# Patient Record
Sex: FEMALE | NOT HISPANIC OR LATINO
[De-identification: names, ages, dates, MRNs, and addresses within clinical notes are randomized per-mention and may not be internally consistent; named-entity substitution may affect disease eponyms.]

---

## 2017-01-24 PROBLEM — Z00.00 ENCOUNTER FOR PREVENTIVE HEALTH EXAMINATION: Status: ACTIVE | Noted: 2017-01-24

## 2017-03-09 ENCOUNTER — APPOINTMENT (OUTPATIENT)
Dept: ENDOCRINOLOGY | Facility: CLINIC | Age: 62
End: 2017-03-09

## 2020-02-12 ENCOUNTER — APPOINTMENT (OUTPATIENT)
Dept: PULMONOLOGY | Facility: CLINIC | Age: 65
End: 2020-02-12
Payer: COMMERCIAL

## 2020-02-12 VITALS
SYSTOLIC BLOOD PRESSURE: 120 MMHG | RESPIRATION RATE: 12 BRPM | HEART RATE: 79 BPM | TEMPERATURE: 97.9 F | HEIGHT: 68 IN | OXYGEN SATURATION: 98 % | BODY MASS INDEX: 23.19 KG/M2 | DIASTOLIC BLOOD PRESSURE: 80 MMHG | WEIGHT: 153 LBS

## 2020-02-12 DIAGNOSIS — J31.0 CHRONIC RHINITIS: ICD-10-CM

## 2020-02-12 DIAGNOSIS — Z82.49 FAMILY HISTORY OF ISCHEMIC HEART DISEASE AND OTHER DISEASES OF THE CIRCULATORY SYSTEM: ICD-10-CM

## 2020-02-12 DIAGNOSIS — Z86.59 PERSONAL HISTORY OF OTHER MENTAL AND BEHAVIORAL DISORDERS: ICD-10-CM

## 2020-02-12 DIAGNOSIS — R06.83 SNORING: ICD-10-CM

## 2020-02-12 DIAGNOSIS — Z82.0 FAMILY HISTORY OF EPILEPSY AND OTHER DISEASES OF THE NERVOUS SYSTEM: ICD-10-CM

## 2020-02-12 PROCEDURE — 99204 OFFICE O/P NEW MOD 45 MIN: CPT

## 2020-02-12 RX ORDER — FLUTICASONE PROPIONATE 50 UG/1
50 SPRAY, METERED NASAL
Refills: 0 | Status: ACTIVE | COMMUNITY

## 2020-02-12 RX ORDER — ATORVASTATIN CALCIUM 10 MG/1
10 TABLET, FILM COATED ORAL
Refills: 0 | Status: ACTIVE | COMMUNITY

## 2020-02-12 RX ORDER — CLONAZEPAM 0.5 MG/1
0.5 TABLET ORAL
Refills: 0 | Status: ACTIVE | COMMUNITY

## 2020-02-12 NOTE — CONSULT LETTER
[Dear  ___] : Dear  [unfilled], [Please see my note below.] : Please see my note below. [Consult Letter:] : I had the pleasure of evaluating your patient, [unfilled]. [FreeTextEntry2] : Tal Kapadia MD\par [address]\[place] [address] [FreeTextEntry3] : Onofre Torres MD\par Director, [facility]\par [facility]\par [address]\[place] [address]\par [phone]  [Sincerely,] : Sincerely,

## 2020-02-12 NOTE — ASSESSMENT
[FreeTextEntry1] : snoring with mild excessive daytime somnolence, which may be related to insufficient sleep.  Does have chronic rhinitis sx, has had nasal surgery in past. \par \par admits to being stressed at work and at home, not devoting enough hours to sleep\par \par Discussed sleep hygiene.  Treatment options for sleep disordered breathing were discussed.  The most rapid and successful treatment remains nasal CPAP or BilevelPAP.  Alternatives include upper airway surgery such as uvulopharyngoplasty or a dental appliance (better for milder cases).  Recently hypoglossal nerve stimulation has been used.  Positional therapy (avoidance of supine posture) can be helpful, and all patients should try to maintain a healthy weight and avoid alcohol or other sedating medications close to bedtime \par \par  Will have unattended home sleep testing to r/o obstructive sleep apnea, if mild or no obstructive sleep apnea may benefit from ENT evaluation\par

## 2020-02-12 NOTE — PHYSICAL EXAM
[General Appearance - Well Developed] : well developed [Normal Appearance] : normal appearance [General Appearance - Well Nourished] : well nourished [Well Groomed] : well groomed [General Appearance - In No Acute Distress] : no acute distress [Normal Conjunctiva] : the conjunctiva exhibited no abnormalities [No Deformities] : no deformities [Normal Oropharynx] : normal oropharynx [Eyelids - No Xanthelasma] : the eyelids demonstrated no xanthelasmas [II] : II [Neck Appearance] : the appearance of the neck was normal [Neck Cervical Mass (___cm)] : no neck mass was observed [FreeTextEntry1] : no  posterior nasal exudate [Thyroid Diffuse Enlargement] : the thyroid was not enlarged [Jugular Venous Distention Increased] : there was no jugular-venous distention [Thyroid Nodule] : there were no palpable thyroid nodules [Heart Sounds Gallop] : no gallops [Heart Rate And Rhythm] : heart rate was normal and rhythm regular [Heart Sounds] : normal S1 and S2 [Heart Sounds Pericardial Friction Rub] : no pericardial rub [Murmurs] : no murmurs [Auscultation Breath Sounds / Voice Sounds] : lungs were clear to auscultation bilaterally [Abnormal Walk] : normal gait [Musculoskeletal - Swelling] : no joint swelling seen [Motor Tone] : muscle strength and tone were normal [Nail Clubbing] : no clubbing of the fingernails [Petechial Hemorrhages (___cm)] : no petechial hemorrhages [] : no ischemic changes [Cyanosis, Localized] : no localized cyanosis [Deep Tendon Reflexes (DTR)] : deep tendon reflexes were 2+ and symmetric [Sensation] : the sensory exam was normal to light touch and pinprick [No Focal Deficits] : no focal deficits

## 2020-02-28 ENCOUNTER — APPOINTMENT (OUTPATIENT)
Dept: SLEEP CENTER | Facility: HOME HEALTH | Age: 65
End: 2020-02-28
Payer: COMMERCIAL

## 2020-02-28 ENCOUNTER — OUTPATIENT (OUTPATIENT)
Dept: OUTPATIENT SERVICES | Facility: HOSPITAL | Age: 65
LOS: 1 days | End: 2020-02-28
Payer: COMMERCIAL

## 2020-02-28 PROCEDURE — 95800 SLP STDY UNATTENDED: CPT

## 2020-02-28 PROCEDURE — 95800 SLP STDY UNATTENDED: CPT | Mod: 26

## 2020-03-02 DIAGNOSIS — G47.33 OBSTRUCTIVE SLEEP APNEA (ADULT) (PEDIATRIC): ICD-10-CM

## 2020-04-27 ENCOUNTER — APPOINTMENT (OUTPATIENT)
Dept: PULMONOLOGY | Facility: CLINIC | Age: 65
End: 2020-04-27

## 2020-05-06 ENCOUNTER — TRANSCRIPTION ENCOUNTER (OUTPATIENT)
Age: 65
End: 2020-05-06

## 2020-05-06 ENCOUNTER — APPOINTMENT (OUTPATIENT)
Dept: PULMONOLOGY | Facility: CLINIC | Age: 65
End: 2020-05-06
Payer: COMMERCIAL

## 2020-05-06 PROCEDURE — 99214 OFFICE O/P EST MOD 30 MIN: CPT | Mod: 95

## 2020-05-06 NOTE — ASSESSMENT
[FreeTextEntry1] : snoring without singificant obstructive sleep apnea \par \par Treatment options  were discussed.  T Alternatives include upper airway surgery such as uvulopharyngoplasty or a dental appliance (better for milder cases).    Positional therapy (avoidance of supine posture) can be helpful, and all patients should try to maintain a healthy weight and avoid alcohol or other sedating medications close to bedtime.\par \par She would be interested in trying oral appliance (mandibular advancer) -sent info re: dentists with expertise in area.

## 2020-05-06 NOTE — REASON FOR VISIT
[Home] : at home, [unfilled] , at the time of the visit. [Medical Office: ([facility])___] : at the medical office located in  [Patient] : the patient

## 2020-08-12 ENCOUNTER — APPOINTMENT (OUTPATIENT)
Dept: OTOLARYNGOLOGY | Facility: CLINIC | Age: 65
End: 2020-08-12

## 2020-09-21 ENCOUNTER — TRANSCRIPTION ENCOUNTER (OUTPATIENT)
Age: 65
End: 2020-09-21

## 2021-07-02 ENCOUNTER — TRANSCRIPTION ENCOUNTER (OUTPATIENT)
Age: 66
End: 2021-07-02

## 2021-11-24 ENCOUNTER — NON-APPOINTMENT (OUTPATIENT)
Age: 66
End: 2021-11-24

## 2021-11-26 ENCOUNTER — NON-APPOINTMENT (OUTPATIENT)
Age: 66
End: 2021-11-26

## 2021-11-30 ENCOUNTER — NON-APPOINTMENT (OUTPATIENT)
Age: 66
End: 2021-11-30

## 2021-12-01 ENCOUNTER — APPOINTMENT (OUTPATIENT)
Dept: UROLOGY | Facility: CLINIC | Age: 66
End: 2021-12-01
Payer: COMMERCIAL

## 2021-12-01 VITALS
TEMPERATURE: 98.2 F | DIASTOLIC BLOOD PRESSURE: 74 MMHG | BODY MASS INDEX: 26.37 KG/M2 | HEART RATE: 65 BPM | WEIGHT: 174 LBS | HEIGHT: 68 IN | SYSTOLIC BLOOD PRESSURE: 125 MMHG

## 2021-12-01 PROCEDURE — 99204 OFFICE O/P NEW MOD 45 MIN: CPT | Mod: 25

## 2021-12-01 PROCEDURE — 51798 US URINE CAPACITY MEASURE: CPT

## 2021-12-01 RX ORDER — DULOXETINE HYDROCHLORIDE 60 MG/1
60 CAPSULE, DELAYED RELEASE ORAL
Refills: 0 | Status: DISCONTINUED | COMMUNITY
End: 2021-12-01

## 2021-12-01 NOTE — PHYSICAL EXAM
[General Appearance - Well Developed] : well developed [General Appearance - Well Nourished] : well nourished [Normal Appearance] : normal appearance [Well Groomed] : well groomed [General Appearance - In No Acute Distress] : no acute distress [Edema] : no peripheral edema [Respiration, Rhythm And Depth] : normal respiratory rhythm and effort [Exaggerated Use Of Accessory Muscles For Inspiration] : no accessory muscle use [Abdomen Soft] : soft [Abdomen Tenderness] : non-tender [Costovertebral Angle Tenderness] : no ~M costovertebral angle tenderness [Urethral Meatus] : normal urethra [Urinary Bladder Findings] : the bladder was normal on palpation [Normal Station and Gait] : the gait and station were normal for the patient's age [] : no rash [No Focal Deficits] : no focal deficits [Oriented To Time, Place, And Person] : oriented to person, place, and time [Affect] : the affect was normal [Mood] : the mood was normal [Not Anxious] : not anxious [FreeTextEntry1] : Mild pelvic organ prolapse

## 2021-12-01 NOTE — END OF VISIT
[FreeTextEntry3] : 65yo female with urgency, frequency. US 11/2021 reviewed, bilateral non obstructing renal stones can be observed. Random bladder volume = 120cc. Reviewed behavioral modification and option of Myrbetriq. She would like to trial behavioral modification first. F/u 3 months.

## 2021-12-01 NOTE — LETTER BODY
[Dear  ___] : Dear  [unfilled], [Courtesy Letter:] : I had the pleasure of seeing your patient, [unfilled], in my office today. [Please see my note below.] : Please see my note below. [Consult Closing:] : Thank you very much for allowing me to participate in the care of this patient.  If you have any questions, please do not hesitate to contact me. [Sincerely,] : Sincerely, [FreeTextEntry2] : Tal Kapadia MD\par [address]\[place] [address] [FreeTextEntry3] : Damien Steinberg MD, FACS\par Urologic Oncology\par Department of Urology\par [facility]\par \par [employer] [facility] \par \par

## 2021-12-01 NOTE — ASSESSMENT
[FreeTextEntry1] : 66 year old female with ongoing frequency and urgency\par -bladder scan revealed volume of 130 cc.  Last time patient voiding > 1 hour ago.  No urge to void at this time\par -reviewed behavioral modifications with patient \par -likely overactivity of the bladder causing symptoms, discussed addition of Myrbetriq 25 mg.  Goals and side effects of medication reviewed with patient .  She would like to hold off on medication at this time and try behavioral modifications.\par -repeat UA and UCx today \par \par \par RTC in 3 months

## 2021-12-01 NOTE — HISTORY OF PRESENT ILLNESS
[Urinary Incontinence] : urinary incontinence [Urinary Urgency] : urinary urgency [Urinary Frequency] : urinary frequency [FreeTextEntry1] : This is an overall healthy  66 year old woman who presents today for years of urinary frequency and urgency. She reports that after she drink fluid the urge to void comes immediately and may occasionally have leakage of urine.  No pad usage but does line underwear with tissue.  One child delivered vaginally.  Was told by form GYN that " bladder hangs low", denies any sensation of bulging in vaginal area.  No abdominal surgeries and no history of UTIs.  She was advised by her referring physician to use Estrace cream 2 x week which she has been doing.\par \par She presents today with lab work from PCP and GYN.  UCx on 10/23/21 contaminated.  UA was 50 WBC and 5 RBC/HPF.  Repeat UA  on 11/3 no RBC seen, neg nitrates, neg leukocytes, no WBC.\par \par US 11/8/21\par 3mm stone left midpole and 3 mm stone right lower pole.  No hydronephrosis or lesions seen\par \par \par  [Urinary Retention] : no urinary retention [Nocturia] : no nocturia [Straining] : no straining [Dysuria] : no dysuria [Hematuria - Gross] : no gross hematuria [Flank Pain] : no flank pain [Fever] : no fever

## 2021-12-02 LAB
APPEARANCE: CLEAR
BACTERIA: NEGATIVE
BILIRUBIN URINE: NEGATIVE
BLOOD URINE: NEGATIVE
COLOR: NORMAL
GLUCOSE QUALITATIVE U: NEGATIVE
HYALINE CASTS: 1 /LPF
KETONES URINE: NEGATIVE
LEUKOCYTE ESTERASE URINE: NEGATIVE
MICROSCOPIC-UA: NORMAL
NITRITE URINE: NEGATIVE
PH URINE: 7
PROTEIN URINE: NEGATIVE
RED BLOOD CELLS URINE: 2 /HPF
SPECIFIC GRAVITY URINE: 1.02
SQUAMOUS EPITHELIAL CELLS: 2 /HPF
UROBILINOGEN URINE: NORMAL
WHITE BLOOD CELLS URINE: 0 /HPF

## 2021-12-03 ENCOUNTER — TRANSCRIPTION ENCOUNTER (OUTPATIENT)
Age: 66
End: 2021-12-03

## 2021-12-03 LAB — BACTERIA UR CULT: NORMAL

## 2022-05-04 ENCOUNTER — APPOINTMENT (OUTPATIENT)
Dept: UROLOGY | Facility: CLINIC | Age: 67
End: 2022-05-04

## 2023-02-06 ENCOUNTER — APPOINTMENT (OUTPATIENT)
Dept: UROLOGY | Facility: CLINIC | Age: 68
End: 2023-02-06
Payer: COMMERCIAL

## 2023-02-06 VITALS — HEART RATE: 70 BPM | DIASTOLIC BLOOD PRESSURE: 74 MMHG | TEMPERATURE: 98.1 F | SYSTOLIC BLOOD PRESSURE: 120 MMHG

## 2023-02-06 DIAGNOSIS — N20.0 CALCULUS OF KIDNEY: ICD-10-CM

## 2023-02-06 PROCEDURE — 99214 OFFICE O/P EST MOD 30 MIN: CPT

## 2023-02-06 RX ORDER — LISDEXAMFETAMINE DIMESYLATE 10 MG/1
CAPSULE ORAL
Refills: 0 | Status: ACTIVE | COMMUNITY

## 2023-02-06 RX ORDER — ARIPIPRAZOLE 5 MG/1
5 TABLET ORAL
Refills: 0 | Status: ACTIVE | COMMUNITY

## 2023-02-06 RX ORDER — ESCITALOPRAM OXALATE 20 MG/1
20 TABLET, FILM COATED ORAL
Refills: 0 | Status: ACTIVE | COMMUNITY

## 2023-02-06 RX ORDER — SEMAGLUTIDE 1.34 MG/ML
2 INJECTION, SOLUTION SUBCUTANEOUS
Refills: 0 | Status: DISCONTINUED | COMMUNITY
End: 2023-02-06

## 2023-02-06 NOTE — PHYSICAL EXAM
[General Appearance - Well Developed] : well developed [General Appearance - Well Nourished] : well nourished [Normal Appearance] : normal appearance [Well Groomed] : well groomed [General Appearance - In No Acute Distress] : no acute distress [Costovertebral Angle Tenderness] : no ~M costovertebral angle tenderness [Edema] : no peripheral edema [] : no respiratory distress [Exaggerated Use Of Accessory Muscles For Inspiration] : no accessory muscle use [Oriented To Time, Place, And Person] : oriented to person, place, and time [Affect] : the affect was normal [Mood] : the mood was normal [Not Anxious] : not anxious [Normal Station and Gait] : the gait and station were normal for the patient's age [No Palpable Adenopathy] : no palpable adenopathy

## 2023-02-06 NOTE — REVIEW OF SYSTEMS
[see HPI] : see HPI [Anxiety] : anxiety [Depression] : depression [Negative] : Neurological [Fever] : no fever [Chills] : no chills

## 2023-02-06 NOTE — ASSESSMENT
[FreeTextEntry1] : 67 year old female with ongoing frequency and urgency\par Trial Mybetriq, medication renewed today \par Med education, side effects reviewed.\par Reinforced behavioral modifications, decrease Snapple, Diet Coke \par RTC in 6 months

## 2023-02-06 NOTE — END OF VISIT
[FreeTextEntry3] : I, Dr. Steinberg, personally performed the evaluation and management (E/M) services for this established patient who presents today with (a) new problem(s)/exacerbation of (an) existing condition(s).  That E/M includes conducting the examination, assessing all new/exacerbated conditions, and establishing a new plan of care.  Today, our ACP, Judy Tran, was here to observe the evaluation and management services for this new problem/exacerbated condition to be followed going forward.\par

## 2023-02-06 NOTE — HISTORY OF PRESENT ILLNESS
[Urinary Incontinence] : urinary incontinence [Urinary Urgency] : urinary urgency [Urinary Frequency] : urinary frequency [FreeTextEntry1] : This is an overall healthy  66 year old woman who presents today for years of urinary frequency and urgency. She reports that after she drink fluid the urge to void comes immediately and may occasionally have leakage of urine.  No pad usage but does line underwear with tissue.  One child delivered vaginally.  Was told by form GYN that " bladder hangs low", denies any sensation of bulging in vaginal area.  No abdominal surgeries and no history of UTIs.  She was advised by her referring physician to use Estrace cream 2 x week which she has been doing.\par \par She presents today with lab work from PCP and GYN.  UCx on 10/23/21 contaminated.  UA was 50 WBC and 5 RBC/HPF.  Repeat UA  on 11/3 no RBC seen, neg nitrates, neg leukocytes, no WBC.\par \par US 11/8/21\par 3mm stone left midpole and 3 mm stone right lower pole.  No hydronephrosis or lesions seen\par \par 2/6/23 Here for follow up. No changes in urinary frequency, urgency with lifestyle modifications. She is interested in starting Myrbetriq. No other complaints. Drinks water, Snapple, diet Coke. \par \par \par  [Urinary Retention] : no urinary retention [Nocturia] : no nocturia [Straining] : no straining [Dysuria] : no dysuria [Hematuria - Gross] : no gross hematuria [Flank Pain] : no flank pain [Fever] : no fever

## 2023-02-06 NOTE — LETTER BODY
[Dear  ___] : Dear  [unfilled], [Courtesy Letter:] : I had the pleasure of seeing your patient, [unfilled], in my office today. [Please see my note below.] : Please see my note below. [Consult Closing:] : Thank you very much for allowing me to participate in the care of this patient.  If you have any questions, please do not hesitate to contact me. [Sincerely,] : Sincerely, [FreeTextEntry2] : Tal Kapadia MD\par [address]\[place] [address] [FreeTextEntry3] : Damien Steinberg MD, FACS\par Urologic Oncology\par Department of Urology\par [facility]\par \par [employer] [facility] \par \par

## 2023-09-13 ENCOUNTER — APPOINTMENT (OUTPATIENT)
Dept: UROLOGY | Facility: CLINIC | Age: 68
End: 2023-09-13

## 2023-12-04 RX ORDER — MIRABEGRON 25 MG/1
25 TABLET, FILM COATED, EXTENDED RELEASE ORAL
Qty: 30 | Refills: 3 | Status: ACTIVE | COMMUNITY
Start: 2021-12-01 | End: 1900-01-01

## 2024-01-25 ENCOUNTER — APPOINTMENT (OUTPATIENT)
Dept: UROLOGY | Facility: CLINIC | Age: 69
End: 2024-01-25

## 2024-02-08 ENCOUNTER — APPOINTMENT (OUTPATIENT)
Dept: UROLOGY | Facility: CLINIC | Age: 69
End: 2024-02-08
Payer: COMMERCIAL

## 2024-02-08 VITALS
TEMPERATURE: 97.2 F | WEIGHT: 165 LBS | BODY MASS INDEX: 25.01 KG/M2 | DIASTOLIC BLOOD PRESSURE: 70 MMHG | HEIGHT: 68 IN | HEART RATE: 66 BPM | SYSTOLIC BLOOD PRESSURE: 113 MMHG | OXYGEN SATURATION: 98 %

## 2024-02-08 PROCEDURE — 99213 OFFICE O/P EST LOW 20 MIN: CPT

## 2024-02-08 RX ORDER — MIRABEGRON 50 MG/1
50 TABLET, FILM COATED, EXTENDED RELEASE ORAL
Qty: 90 | Refills: 3 | Status: ACTIVE | COMMUNITY
Start: 2024-02-08 | End: 1900-01-01

## 2024-02-08 NOTE — PHYSICAL EXAM
[General Appearance - Well Developed] : well developed [General Appearance - Well Nourished] : well nourished [Normal Appearance] : normal appearance [Well Groomed] : well groomed [General Appearance - In No Acute Distress] : no acute distress [] : no respiratory distress [Abdomen Soft] : soft [No Focal Deficits] : no focal deficits [Oriented To Time, Place, And Person] : oriented to person, place, and time

## 2024-02-08 NOTE — ASSESSMENT
[FreeTextEntry1] : Diagnosis: OAB, UUI  Plan: PVR was 48 cc  Increase Myrbetriq to 50 mg  If no improvement in symptoms recommend follow up with Dr. Bowden for discussion of 3rd line therapies. Continue with Behavorial modifications.  Follow up with Dr. Bowden in 8 weeks     Carrillo Greene MD, FACS, FRCS  of Urology [facility] Director of Laparoscopic and Robotic Surgery Health system Director of Urology, [facility] Professor of Urology (Office) [phone] (Cell) [phone] [email or web address]   I performed history/review of imaging, discussed treatment plan with patient, agree with above transcription by LUDA.  The total amount of time I have personally spent preparing for this visit, reviewing the patient's test results, obtaining external history, ordering tests/medications, documenting clinical information, communicating with and counseling the patient/family and/or caregiver(s), and spent face to face with the patient explaining the above was minutes =20

## 2024-02-08 NOTE — HISTORY OF PRESENT ILLNESS
[FreeTextEntry1] : 68 year old woman  CC of urinary frequency and urgency.  Occasionally have leakage of urine.  Personally reviewed and independently interpreted UCx on 10/23/21 contaminated. UA was 50 WBC and 5 RBC/HPF. Repeat UA on 11/3 no RBC seen, neg nitrates, neg leukocytes, no WBC.  personally reviewed and independently interpreted US 11/8/21 3mm stone left midpole and 3 mm stone right lower pole. No hydronephrosis or lesions seen  She was started on Myrbetriq 25 mg and feels that medication initially helped but recently over the past few months reports worsening urgency Wearing tissues in underwear for protection.  Currently denies any hematuria of dysuria  No prolapse sensation

## 2024-05-01 ENCOUNTER — APPOINTMENT (OUTPATIENT)
Dept: UROLOGY | Facility: CLINIC | Age: 69
End: 2024-05-01
Payer: COMMERCIAL

## 2024-05-01 VITALS
TEMPERATURE: 97.7 F | HEART RATE: 68 BPM | DIASTOLIC BLOOD PRESSURE: 80 MMHG | SYSTOLIC BLOOD PRESSURE: 123 MMHG | OXYGEN SATURATION: 98 %

## 2024-05-01 DIAGNOSIS — N32.81 OVERACTIVE BLADDER: ICD-10-CM

## 2024-05-01 PROCEDURE — 99214 OFFICE O/P EST MOD 30 MIN: CPT

## 2024-05-01 PROCEDURE — 99204 OFFICE O/P NEW MOD 45 MIN: CPT

## 2024-05-01 RX ORDER — ESTRADIOL 0.1 MG/G
0.1 CREAM VAGINAL
Qty: 1 | Refills: 0 | Status: ACTIVE | COMMUNITY
Start: 2024-05-01 | End: 1900-01-01

## 2024-05-03 LAB — BACTERIA UR CULT: NORMAL

## 2024-05-09 PROBLEM — N32.81 OVERACTIVE BLADDER: Status: ACTIVE | Noted: 2021-12-01

## 2024-05-09 NOTE — PHYSICAL EXAM
[General Appearance - Well Developed] : well developed [General Appearance - Well Nourished] : well nourished [Normal Appearance] : normal appearance [Well Groomed] : well groomed [General Appearance - In No Acute Distress] : no acute distress [] : no respiratory distress [Exaggerated Use Of Accessory Muscles For Inspiration] : no accessory muscle use [Abdomen Soft] : soft [Abdomen Tenderness] : non-tender [External Female Genitalia] : normal external genitalia [Normal Station and Gait] : the gait and station were normal for the patient's age [Skin Color & Pigmentation] : normal skin color and pigmentation [No Focal Deficits] : no focal deficits [Oriented To Time, Place, And Person] : oriented to person, place, and time [Affect] : the affect was normal [Mood] : the mood was normal [Not Anxious] : not anxious [de-identified] : Stage 1 cystocele, neg apical/posterior prolapse, neg CST, neg UH

## 2024-05-09 NOTE — ASSESSMENT
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   -OAB thoroughly explained -Behavior Modification-Avoid bladder irritants such as caffeine, carbonated beverages, citrus, spicy foods, tomatoes, alcohol. Drink to thirst. Stop fluids 2-3 hours before bedtime. Bladder retraining discussed; literature provided.  -3rd line therapies discussed including Botox and neuromodulation; literature provided.  -Patient would like to consider options. Will start Estrace daily x 2 weeks then Monday, Wednesday and Friday -Urine culture today -F/u in 3-4 months or sooner if she would like to move forward with 3rd line therapy.   I, Dr. Lindsey Bowden, personally performed the evaluation and management (E/M) services for this new patient.  That E/M includes conducting the clinically appropriate initial history &/or exam, assessing all conditions, and establishing the plan of care.  Today, my LUDA Anne Marie, was here to observe &/or participate in the visit & follow plan of care established by me.

## 2024-05-09 NOTE — HISTORY OF PRESENT ILLNESS
[FreeTextEntry1] : 69-year-old  female referred by Dr. Greene due to OAB-wet. She started Myrbetriq 25mg 1 year ago and was increased to 50mg in 2024. She reports some improvement in symptoms but nocturia and urge incontinence persists. She urinates 6 times during the day and 2-3 times at night. Uses 1 pad per day. Often has to double void. States she was told previously that she had a dropped bladder.  Denies intermittency, hesitancy, prolapse sensation, constipation, dysuria, fever, chills.   Drinks 4-5 cans of diet coke per week, also has Snapple teas and seltzer on weekends. She uses lemon a few times per week. Tomatoes 3-4 times per week. Drinks fluids until bedtime.   3/13/24 Urine culture no growth  24 PVR-92ml  PMH-high cholesterol, depression PSH-bunion surgery b/l feet, cosmetic surgery to b/l eyes and nose  FamHx-Breast CA(Mother) SocHx-Denies smoking, drugs or alcohol use Meds/Vitamins-centrum silver, biotin, Lipitor, Rexulti, Trintellix, klonopin, levothyroxine, vitamin D, Myrbetriq Allergies-denies

## 2024-05-09 NOTE — LETTER BODY
[Dear  ___] : Dear  [unfilled], [Consult Letter:] : I had the pleasure of evaluating your patient, [unfilled]. [Please see my note below.] : Please see my note below. [Consult Closing:] : Thank you very much for allowing me to participate in the care of this patient.  If you have any questions, please do not hesitate to contact me. [Sincerely,] : Sincerely, [FreeTextEntry3] : Lindsey Bowden MD System Director Urogynecology/FPS Department of Urology [facility]    at [facility]  of Urology [facility] [employer] at [facility]

## 2024-05-16 ENCOUNTER — NON-APPOINTMENT (OUTPATIENT)
Age: 69
End: 2024-05-16

## 2024-12-25 PROBLEM — F10.90 ALCOHOL USE: Status: INACTIVE | Noted: 2020-02-12

## 2025-05-15 ENCOUNTER — APPOINTMENT (OUTPATIENT)
Dept: RHEUMATOLOGY | Facility: CLINIC | Age: 70
End: 2025-05-15
Payer: MEDICARE

## 2025-05-15 VITALS
SYSTOLIC BLOOD PRESSURE: 121 MMHG | DIASTOLIC BLOOD PRESSURE: 76 MMHG | WEIGHT: 171 LBS | HEART RATE: 74 BPM | HEIGHT: 68 IN | BODY MASS INDEX: 25.91 KG/M2 | TEMPERATURE: 97.2 F | OXYGEN SATURATION: 97 %

## 2025-05-15 DIAGNOSIS — M85.80 OTHER SPECIFIED DISORDERS OF BONE DENSITY AND STRUCTURE, UNSPECIFIED SITE: ICD-10-CM

## 2025-05-15 PROCEDURE — G2211 COMPLEX E/M VISIT ADD ON: CPT

## 2025-05-15 PROCEDURE — 99205 OFFICE O/P NEW HI 60 MIN: CPT

## 2025-05-15 RX ORDER — PSYLLIUM HUSK 0.4 G
1000-20 CAPSULE ORAL
Qty: 90 | Refills: 0 | Status: ACTIVE | COMMUNITY
Start: 2025-05-15 | End: 1900-01-01

## 2025-05-15 RX ORDER — CHOLECALCIFEROL (VITAMIN D3) 125 MCG
TABLET ORAL
Refills: 0 | Status: ACTIVE | COMMUNITY

## 2025-05-15 RX ORDER — SELEGILINE HYDROCHLORIDE 5 MG/1
5 TABLET ORAL
Refills: 0 | Status: ACTIVE | COMMUNITY